# Patient Record
Sex: MALE | Race: WHITE | NOT HISPANIC OR LATINO | URBAN - METROPOLITAN AREA
[De-identification: names, ages, dates, MRNs, and addresses within clinical notes are randomized per-mention and may not be internally consistent; named-entity substitution may affect disease eponyms.]

---

## 2017-04-19 ENCOUNTER — OFFICE VISIT (OUTPATIENT)
Dept: CARDIOLOGY | Facility: CLINIC | Age: 42
End: 2017-04-19

## 2017-04-19 VITALS
WEIGHT: 200 LBS | DIASTOLIC BLOOD PRESSURE: 76 MMHG | HEIGHT: 70 IN | SYSTOLIC BLOOD PRESSURE: 120 MMHG | BODY MASS INDEX: 28.63 KG/M2 | HEART RATE: 72 BPM

## 2017-04-19 DIAGNOSIS — R06.09 DYSPNEA ON EXERTION: ICD-10-CM

## 2017-04-19 DIAGNOSIS — R07.9 CHEST PAIN, UNSPECIFIED TYPE: Primary | ICD-10-CM

## 2017-04-19 PROCEDURE — 99204 OFFICE O/P NEW MOD 45 MIN: CPT | Performed by: INTERNAL MEDICINE

## 2017-04-19 PROCEDURE — 93000 ELECTROCARDIOGRAM COMPLETE: CPT | Performed by: INTERNAL MEDICINE

## 2017-04-19 NOTE — PROGRESS NOTES
Subjective:     Encounter Date:04/19/2017      Patient ID: Adin Bernard is a 41 y.o. male.    Chief Complaint:  History of Present Illness     The patient is a 41-year-old male with no significant past medical history who presents for evaluation of chest discomfort. The patient reports that he has been having this feeling of chest pressure in the left side of his chest for the last couple of months. The symptoms are intermittent. They are almost on a daily basis. It is not associated with exertion. In fact, the patient is pretty active and practices "[employer]" without any issues. He does report, however, when he has been exercising recently that he has been a little bit more short of breath than normal. He denies any significant palpitations. He denies any lower extremity edema, dizziness or lightheadedness, PND or orthopnea, presyncope or syncope. He has no significant family history of premature coronary artery disease. He is concerned enough about the symptoms that he would like to get further testing.         Review of Systems   Constitution: Negative for weakness and malaise/fatigue.   HENT: Negative for headaches, hearing loss, hoarse voice, nosebleeds and sore throat.    Eyes: Negative for pain.   Cardiovascular: Positive for chest pain and dyspnea on exertion. Negative for claudication, cyanosis, irregular heartbeat, leg swelling, near-syncope, orthopnea, palpitations, paroxysmal nocturnal dyspnea and syncope.   Respiratory: Negative for shortness of breath and snoring.    Endocrine: Negative for cold intolerance, heat intolerance, polydipsia, polyphagia and polyuria.   Skin: Negative for itching and rash.   Musculoskeletal: Negative for arthritis, falls, joint pain, joint swelling, muscle cramps, muscle weakness and myalgias.   Gastrointestinal: Negative for constipation, diarrhea, dysphagia, heartburn, hematemesis, hematochezia, melena, nausea and vomiting.   Genitourinary: Negative for frequency,  "hematuria and hesitancy.   Neurological: Negative for excessive daytime sleepiness, dizziness, light-headedness and numbness.   Psychiatric/Behavioral: Negative for depression. The patient is not nervous/anxious.           Current Outpatient Prescriptions:   None presently    Past Medical History:   Diagnosis Date   • Back pain    • Body aches    • Bronchitis    • Chills    • Fever    • Joint pain    • Polyarthralgia    • Rash      History reviewed. No pertinent surgical history.     Family History   Problem Relation Age of Onset   • Heart disease Maternal Grandmother    • Hypertension Maternal Grandmother    • Diabetes Maternal Grandmother    • Cancer Maternal Grandfather      Social History   Substance Use Topics   • Smoking status: Former Smoker   • Smokeless tobacco: None   • Alcohol use Yes      Comment: 3-4 drinks occasionally             ECG 12 Lead  Date/Time: 4/19/2017 5:09 PM  Performed by: NEEMA RASCON  Authorized by: NEEMA RASCON   Comparison: compared with previous ECG   Similar to previous ECG  Rhythm: sinus rhythm               Objective:         Visit Vitals   • /76 (BP Location: Right arm, Patient Position: Sitting)   • Pulse 72   • Ht 70\" (177.8 cm)   • Wt 200 lb (90.7 kg)   • BMI 28.7 kg/m2          Physical Exam   Constitutional: He is oriented to person, place, and time. He appears well-developed and well-nourished.   HENT:   Head: Normocephalic and atraumatic.   Eyes: Conjunctivae, EOM and lids are normal. Pupils are equal, round, and reactive to light.   Neck: Normal range of motion and full passive range of motion without pain. Neck supple. No JVD present. Carotid bruit is not present.   Cardiovascular: Normal rate, regular rhythm, S1 normal and S2 normal.  Exam reveals no gallop.    No murmur heard.  Pulses:       Radial pulses are 2+ on the right side, and 2+ on the left side.   No bilateral lower extremity edema   Pulmonary/Chest: Effort normal and breath sounds normal. "   Abdominal: Soft. Normal appearance.   Lymphadenopathy:     He has no cervical adenopathy.   Neurological: He is alert and oriented to person, place, and time.   Skin: Skin is warm, dry and intact.   Psychiatric: He has a normal mood and affect.       Lab Review:       Assessment:          Diagnosis Plan   1. Chest pain, unspecified type  Treadmill Stress Test    Adult Transthoracic Echo Complete   2. Dyspnea on exertion  Adult Transthoracic Echo Complete          Plan:        1. Chest pain. Symptoms have atypical qualities in that they do not seem to occur at high levels of exercise. It is also atypical that they mainly occur at rest. The type of symptoms that he is having is a little bit more concerning. He otherwise though does not have any risk factors for coronary artery disease, premature or otherwise. At this point I think it is reasonable to get him set up for a plain treadmill stress test. Because of his dyspnea on exertion, we will also get him set up for an echocardiogram.     We will call and discuss the results of the stress test and the echocardiogram which will be performed at [facility]. We will determine further workup, management and followup based on his results.